# Patient Record
Sex: FEMALE | Race: ASIAN | HISPANIC OR LATINO
[De-identification: names, ages, dates, MRNs, and addresses within clinical notes are randomized per-mention and may not be internally consistent; named-entity substitution may affect disease eponyms.]

---

## 2019-04-10 ENCOUNTER — APPOINTMENT (OUTPATIENT)
Dept: FAMILY MEDICINE | Facility: CLINIC | Age: 19
End: 2019-04-10

## 2019-12-12 ENCOUNTER — APPOINTMENT (OUTPATIENT)
Dept: FAMILY MEDICINE | Facility: CLINIC | Age: 19
End: 2019-12-12
Payer: MEDICAID

## 2019-12-12 VITALS
BODY MASS INDEX: 27.45 KG/M2 | RESPIRATION RATE: 16 BRPM | HEIGHT: 62.5 IN | HEART RATE: 89 BPM | OXYGEN SATURATION: 99 % | SYSTOLIC BLOOD PRESSURE: 106 MMHG | DIASTOLIC BLOOD PRESSURE: 74 MMHG | WEIGHT: 153 LBS

## 2019-12-12 DIAGNOSIS — Z80.49 FAMILY HISTORY OF MALIGNANT NEOPLASM OF OTHER GENITAL ORGANS: ICD-10-CM

## 2019-12-12 DIAGNOSIS — Z82.49 FAMILY HISTORY OF ISCHEMIC HEART DISEASE AND OTHER DISEASES OF THE CIRCULATORY SYSTEM: ICD-10-CM

## 2019-12-12 PROCEDURE — 99385 PREV VISIT NEW AGE 18-39: CPT | Mod: 25

## 2019-12-12 PROCEDURE — 36415 COLL VENOUS BLD VENIPUNCTURE: CPT

## 2019-12-12 RX ORDER — LEVOCETIRIZINE DIHYDROCHLORIDE 5 MG/1
5 TABLET ORAL
Refills: 0 | Status: ACTIVE | COMMUNITY
Start: 2019-12-12

## 2019-12-12 RX ORDER — BUDESONIDE AND FORMOTEROL FUMARATE DIHYDRATE 80; 4.5 UG/1; UG/1
80-4.5 AEROSOL RESPIRATORY (INHALATION)
Refills: 0 | Status: ACTIVE | COMMUNITY
Start: 2019-12-12

## 2019-12-12 RX ORDER — ALBUTEROL SULFATE 90 UG/1
108 (90 BASE) AEROSOL, METERED RESPIRATORY (INHALATION)
Refills: 0 | Status: ACTIVE | COMMUNITY
Start: 2019-12-12

## 2019-12-12 NOTE — PHYSICAL EXAM
[20/___] : right eye 20/[unfilled] [Normal Oropharynx] : the oropharynx was normal [Snellen] : acuity screening with Snellen chart [No Lymphadenopathy] : no lymphadenopathy [Normal TMs] : both tympanic membranes were normal [Supple] : supple [Thyroid Normal, No Nodules] : the thyroid was normal and there were no nodules present [No Edema] : there was no peripheral edema [No Extremity Clubbing/Cyanosis] : no extremity clubbing/cyanosis [Grossly Normal Strength/Tone] : grossly normal strength/tone [Normal] : affect was normal and insight and judgment were intact

## 2019-12-12 NOTE — PLAN
[FreeTextEntry1] : Will follow up labwork drawn in office today.\par Will get us records from Pediatrician. \par Will consider flu vaccine. \par Eye exam form filled out.  20/20 on Snellen. \par \par

## 2019-12-12 NOTE — HEALTH RISK ASSESSMENT
[No] : No [# of Members in Household ___] :  household currently consist of [unfilled] member(s) [HIV test declined] : HIV test declined [Student] : student [College] : College [Single] : single [Fully functional (bathing, dressing, toileting, transferring, walking, feeding)] : Fully functional (bathing, dressing, toileting, transferring, walking, feeding) [Fully functional (using the telephone, shopping, preparing meals, housekeeping, doing laundry, using] : Fully functional and needs no help or supervision to perform IADLs (using the telephone, shopping, preparing meals, housekeeping, doing laundry, using transportation, managing medications and managing finances) [Reports normal functional visual acuity (ie: able to read med bottle)] : Reports normal functional visual acuity [] : No [de-identified] : Pulm/Allergy-August [de-identified] : Gym-3x a week  [de-identified] : Varied [Sexually Active] : not sexually active [Reports changes in hearing] : Reports no changes in hearing [Reports changes in vision] : Reports no changes in vision [de-identified] : 3 siblings [Reports changes in dental health] : Reports no changes in dental health [de-identified] : Childhood Ed at [employer]

## 2019-12-12 NOTE — HISTORY OF PRESENT ILLNESS
[Parent] : parent [FreeTextEntry1] : Ms. CAMPBELL presents for annual physical.\par  [de-identified] : Ms. CAMPBELL presents for annual physical.\par Senior in college-graduating in the spring. \par Feeling well today.  History of asthma, has not needed rescue inhaler in a long time.  Takes Symbicort regularly and follows with Pulm.\par \par LMP last thursday 5 days in length.  Periods have been regular.\par Needs vision form for DMV. \par Will get us records from Pediatrician.

## 2019-12-13 LAB
ALBUMIN SERPL ELPH-MCNC: 4.4 G/DL
ALP BLD-CCNC: 78 U/L
ALT SERPL-CCNC: 18 U/L
ANION GAP SERPL CALC-SCNC: 13 MMOL/L
AST SERPL-CCNC: 19 U/L
BASOPHILS # BLD AUTO: 0.03 K/UL
BASOPHILS NFR BLD AUTO: 0.4 %
BILIRUB SERPL-MCNC: 0.2 MG/DL
BUN SERPL-MCNC: 12 MG/DL
CALCIUM SERPL-MCNC: 9.6 MG/DL
CHLORIDE SERPL-SCNC: 105 MMOL/L
CO2 SERPL-SCNC: 24 MMOL/L
CREAT SERPL-MCNC: 0.72 MG/DL
EOSINOPHIL # BLD AUTO: 0.45 K/UL
EOSINOPHIL NFR BLD AUTO: 6.3 %
GLUCOSE SERPL-MCNC: 89 MG/DL
HCT VFR BLD CALC: 38.6 %
HGB BLD-MCNC: 12.2 G/DL
IMM GRANULOCYTES NFR BLD AUTO: 0.1 %
LYMPHOCYTES # BLD AUTO: 2.21 K/UL
LYMPHOCYTES NFR BLD AUTO: 31 %
MAN DIFF?: NORMAL
MCHC RBC-ENTMCNC: 25.4 PG
MCHC RBC-ENTMCNC: 31.6 GM/DL
MCV RBC AUTO: 80.2 FL
MONOCYTES # BLD AUTO: 0.37 K/UL
MONOCYTES NFR BLD AUTO: 5.2 %
NEUTROPHILS # BLD AUTO: 4.05 K/UL
NEUTROPHILS NFR BLD AUTO: 57 %
PLATELET # BLD AUTO: 406 K/UL
POTASSIUM SERPL-SCNC: 4.3 MMOL/L
PROT SERPL-MCNC: 7 G/DL
RBC # BLD: 4.81 M/UL
RBC # FLD: 15.4 %
SODIUM SERPL-SCNC: 142 MMOL/L
TSH SERPL-ACNC: 2.41 UIU/ML
WBC # FLD AUTO: 7.12 K/UL

## 2020-09-28 ENCOUNTER — APPOINTMENT (OUTPATIENT)
Dept: FAMILY MEDICINE | Facility: CLINIC | Age: 20
End: 2020-09-28
Payer: MEDICAID

## 2020-09-28 VITALS
HEART RATE: 82 BPM | OXYGEN SATURATION: 98 % | RESPIRATION RATE: 16 BRPM | SYSTOLIC BLOOD PRESSURE: 112 MMHG | WEIGHT: 141 LBS | DIASTOLIC BLOOD PRESSURE: 76 MMHG

## 2020-09-28 VITALS — TEMPERATURE: 98.3 F

## 2020-09-28 DIAGNOSIS — R22.9 LOCALIZED SWELLING, MASS AND LUMP, UNSPECIFIED: ICD-10-CM

## 2020-09-28 DIAGNOSIS — R23.8 OTHER SKIN CHANGES: ICD-10-CM

## 2020-09-28 PROCEDURE — 99214 OFFICE O/P EST MOD 30 MIN: CPT | Mod: 25

## 2020-09-28 PROCEDURE — 36415 COLL VENOUS BLD VENIPUNCTURE: CPT

## 2020-09-28 NOTE — PHYSICAL EXAM
[Normal Oropharynx] : the oropharynx was normal [Normal TMs] : both tympanic membranes were normal [Supple] : supple [Thyroid Normal, No Nodules] : the thyroid was normal and there were no nodules present [Normal] : affect was normal and insight and judgment were intact [de-identified] : right posterior bump behind SCM; pinna non tender [de-identified] : right neck-palpable lump, non tender, no active erythema or draiange, skin scaling over area.  Inflammed hair follicle superior to lesion.

## 2020-09-28 NOTE — PLAN
[FreeTextEntry1] : Stable exam.  No fever. \par Will send for imaging of area; recommending General Surgery evaluation.  Recurrent issue, mom reports similar episode happened last year. \par \par Will follow up labwork drawn in office today.  Can apply warm compress to inflammed hair follicle. \par \par Advised to seek immediate medical re-evaluation if any worsening symptoms/changes. \par Patient and mom expressed understanding.

## 2020-09-28 NOTE — REVIEW OF SYSTEMS
[Negative] : Genitourinary [Earache] : no earache [Sore Throat] : no sore throat [Headache] : no headache [Dizziness] : no dizziness [FreeTextEntry4] : Some tenderness last week. Right neck bump.

## 2020-09-28 NOTE — HISTORY OF PRESENT ILLNESS
[Parent] : parent [FreeTextEntry8] : Here for evaluation of right neck cyst. \par \par Had cyst on neck for 1 year-got bigger and started hurting last week. \par Started on Amoxicillin last week for 5 days. Feeling better today-not tender. \par \par Saw Dr. Durand-Allergy in April. \par Medications and allergies reviewed.\par \par \par

## 2020-09-29 DIAGNOSIS — D64.9 ANEMIA, UNSPECIFIED: ICD-10-CM

## 2020-09-29 LAB
ALBUMIN SERPL ELPH-MCNC: 4.3 G/DL
ALP BLD-CCNC: 70 U/L
ALT SERPL-CCNC: 15 U/L
ANION GAP SERPL CALC-SCNC: 17 MMOL/L
AST SERPL-CCNC: 24 U/L
BASOPHILS # BLD AUTO: 0.03 K/UL
BASOPHILS NFR BLD AUTO: 0.4 %
BILIRUB SERPL-MCNC: 0.2 MG/DL
BUN SERPL-MCNC: 13 MG/DL
CALCIUM SERPL-MCNC: 9.3 MG/DL
CHLORIDE SERPL-SCNC: 106 MMOL/L
CO2 SERPL-SCNC: 18 MMOL/L
CREAT SERPL-MCNC: 0.74 MG/DL
EOSINOPHIL # BLD AUTO: 0.53 K/UL
EOSINOPHIL NFR BLD AUTO: 7.6 %
GLUCOSE SERPL-MCNC: 71 MG/DL
HCT VFR BLD CALC: 37.7 %
HGB BLD-MCNC: 11.4 G/DL
IMM GRANULOCYTES NFR BLD AUTO: 0.3 %
LYMPHOCYTES # BLD AUTO: 2.17 K/UL
LYMPHOCYTES NFR BLD AUTO: 31 %
MAN DIFF?: NORMAL
MCHC RBC-ENTMCNC: 24.2 PG
MCHC RBC-ENTMCNC: 30.2 GM/DL
MCV RBC AUTO: 80 FL
MONOCYTES # BLD AUTO: 0.47 K/UL
MONOCYTES NFR BLD AUTO: 6.7 %
NEUTROPHILS # BLD AUTO: 3.79 K/UL
NEUTROPHILS NFR BLD AUTO: 54 %
PLATELET # BLD AUTO: 400 K/UL
POTASSIUM SERPL-SCNC: 4.3 MMOL/L
PROT SERPL-MCNC: 6.8 G/DL
RBC # BLD: 4.71 M/UL
RBC # FLD: 17.3 %
SODIUM SERPL-SCNC: 140 MMOL/L
WBC # FLD AUTO: 7.01 K/UL

## 2020-10-01 LAB
FERRITIN SERPL-MCNC: 11 NG/ML
IRON SATN MFR SERPL: 6 %
IRON SERPL-MCNC: 28 UG/DL
TIBC SERPL-MCNC: 444 UG/DL
TRANSFERRIN SERPL-MCNC: 327 MG/DL
UIBC SERPL-MCNC: 416 UG/DL

## 2023-05-02 ENCOUNTER — APPOINTMENT (OUTPATIENT)
Dept: FAMILY MEDICINE | Facility: CLINIC | Age: 23
End: 2023-05-02
Payer: MEDICAID

## 2023-05-02 VITALS
OXYGEN SATURATION: 97 % | BODY MASS INDEX: 28.22 KG/M2 | TEMPERATURE: 97.8 F | SYSTOLIC BLOOD PRESSURE: 117 MMHG | WEIGHT: 153.38 LBS | HEIGHT: 62 IN | HEART RATE: 93 BPM | DIASTOLIC BLOOD PRESSURE: 76 MMHG

## 2023-05-02 DIAGNOSIS — Z00.00 ENCOUNTER FOR GENERAL ADULT MEDICAL EXAMINATION W/OUT ABNORMAL FINDINGS: ICD-10-CM

## 2023-05-02 DIAGNOSIS — J45.909 UNSPECIFIED ASTHMA, UNCOMPLICATED: ICD-10-CM

## 2023-05-02 PROCEDURE — 99395 PREV VISIT EST AGE 18-39: CPT | Mod: 25

## 2023-05-03 ENCOUNTER — TRANSCRIPTION ENCOUNTER (OUTPATIENT)
Age: 23
End: 2023-05-03

## 2023-05-03 LAB
ALBUMIN SERPL ELPH-MCNC: 4.6 G/DL
ALP BLD-CCNC: 76 U/L
ALT SERPL-CCNC: 13 U/L
ANION GAP SERPL CALC-SCNC: 13 MMOL/L
AST SERPL-CCNC: 18 U/L
BASOPHILS # BLD AUTO: 0.03 K/UL
BASOPHILS NFR BLD AUTO: 0.3 %
BILIRUB SERPL-MCNC: 0.2 MG/DL
BUN SERPL-MCNC: 13 MG/DL
CALCIUM SERPL-MCNC: 9.1 MG/DL
CHLORIDE SERPL-SCNC: 102 MMOL/L
CHOLEST SERPL-MCNC: 208 MG/DL
CO2 SERPL-SCNC: 23 MMOL/L
CREAT SERPL-MCNC: 0.72 MG/DL
EGFR: 119 ML/MIN/1.73M2
EOSINOPHIL # BLD AUTO: 0.61 K/UL
EOSINOPHIL NFR BLD AUTO: 5.1 %
ESTIMATED AVERAGE GLUCOSE: 111 MG/DL
GLUCOSE SERPL-MCNC: 98 MG/DL
HBA1C MFR BLD HPLC: 5.5 %
HCT VFR BLD CALC: 38.3 %
HDLC SERPL-MCNC: 51 MG/DL
HGB BLD-MCNC: 11.8 G/DL
IMM GRANULOCYTES NFR BLD AUTO: 0.2 %
LDLC SERPL CALC-MCNC: 139 MG/DL
LYMPHOCYTES # BLD AUTO: 2.6 K/UL
LYMPHOCYTES NFR BLD AUTO: 21.8 %
MAN DIFF?: NORMAL
MCHC RBC-ENTMCNC: 23.4 PG
MCHC RBC-ENTMCNC: 30.8 GM/DL
MCV RBC AUTO: 75.8 FL
MONOCYTES # BLD AUTO: 0.57 K/UL
MONOCYTES NFR BLD AUTO: 4.8 %
NEUTROPHILS # BLD AUTO: 8.08 K/UL
NEUTROPHILS NFR BLD AUTO: 67.8 %
NONHDLC SERPL-MCNC: 157 MG/DL
PLATELET # BLD AUTO: 441 K/UL
POTASSIUM SERPL-SCNC: 3.9 MMOL/L
PROT SERPL-MCNC: 7.4 G/DL
RBC # BLD: 5.05 M/UL
RBC # FLD: 17.3 %
SODIUM SERPL-SCNC: 138 MMOL/L
TRIGL SERPL-MCNC: 92 MG/DL
TSH SERPL-ACNC: 1.56 UIU/ML
WBC # FLD AUTO: 11.91 K/UL

## 2023-06-27 ENCOUNTER — APPOINTMENT (OUTPATIENT)
Dept: FAMILY MEDICINE | Facility: CLINIC | Age: 23
End: 2023-06-27

## 2024-04-18 ENCOUNTER — NON-APPOINTMENT (OUTPATIENT)
Age: 24
End: 2024-04-18

## 2024-04-18 DIAGNOSIS — J45.41 MODERATE PERSISTENT ASTHMA WITH (ACUTE) EXACERBATION: ICD-10-CM

## 2024-04-30 ENCOUNTER — APPOINTMENT (OUTPATIENT)
Dept: PEDIATRIC ALLERGY IMMUNOLOGY | Facility: CLINIC | Age: 24
End: 2024-04-30
Payer: MEDICAID

## 2024-04-30 DIAGNOSIS — J45.40 MODERATE PERSISTENT ASTHMA, UNCOMPLICATED: ICD-10-CM

## 2024-04-30 DIAGNOSIS — J30.2 OTHER SEASONAL ALLERGIC RHINITIS: ICD-10-CM

## 2024-04-30 PROCEDURE — 99213 OFFICE O/P EST LOW 20 MIN: CPT | Mod: 25

## 2024-04-30 PROCEDURE — 94010 BREATHING CAPACITY TEST: CPT

## 2024-04-30 RX ORDER — BUDESONIDE AND FORMOTEROL FUMARATE DIHYDRATE 80; 4.5 UG/1; UG/1
80-4.5 AEROSOL RESPIRATORY (INHALATION)
Qty: 3 | Refills: 1 | Status: ACTIVE | COMMUNITY
Start: 2024-04-30 | End: 1900-01-01

## 2024-04-30 RX ORDER — ALBUTEROL SULFATE 90 UG/1
108 (90 BASE) INHALANT RESPIRATORY (INHALATION)
Qty: 1 | Refills: 1 | Status: ACTIVE | COMMUNITY
Start: 2024-04-30 | End: 1900-01-01

## 2024-04-30 NOTE — PHYSICAL EXAM
[Alert] : alert [No Acute Distress] : no acute distress [Normal Voice/Communication] : normal voice communication [Normal Pupil & Iris Size/Symmetry] : normal pupil and iris size and symmetry [No Discharge] : no discharge [Sclera Not Icteric] : sclera not icteric [Normal TMs] : both tympanic membranes were normal [Normal Nasal Mucosa] : the nasal mucosa was normal [Normal Outer Ear/Nose] : the ears and nose were normal in appearance [No Nasal Discharge] : no nasal discharge [No Thrush] : no thrush [No LAD] : no lymphadenopathy [Supple] : the neck was supple [Normal Rate and Effort] : normal respiratory rhythm and effort [No Crackles] : no crackles [Bilateral Audible Breath Sounds] : bilateral audible breath sounds [Normal Rate] : heart rate was normal  [Normal S1, S2] : normal S1 and S2 [No murmur] : no murmur [Regular Rhythm] : with a regular rhythm [Skin Intact] : skin intact  [No Rash] : no rash [Normal Mood] : mood was normal [Normal Affect] : affect was normal [Alert, Awake, Oriented as Age-Appropriate] : alert, awake, oriented as age appropriate [Wheezing] : no wheezing was heard

## 2024-04-30 NOTE — ASSESSMENT
[FreeTextEntry1] : Persistent asthma not in good control due to running out of Symbicort Restart Symbicort 80 2 puffs BID Albuterol Q4 PRN  Allergic Rhinitis due to spring pollen allergies Xyzal 5mg QHS Nasacort 2 sprays each nostril QD

## 2024-04-30 NOTE — HISTORY OF PRESENT ILLNESS
[de-identified] : Megan presents for a follow up for persistent asthma and to get refills. She ran out of Symbicort 80 about 2 weeks ago. She was fine until yesterday. Reports some intermittent chest tightness. Megan has known allergies to tree/grass pollen. She was out walking this past weekend and feels that may have triggered her asthma as well. Slept fine last night. She ran out of Albuterol as well. Presently she appears comfortable and in no apparent distress. When she is taking Symbicort on a regular basis her asthma is well controlled.  For spring time allergies she takes Xyzal and Nasacort which helps.

## 2025-06-05 ENCOUNTER — INPATIENT (INPATIENT)
Facility: HOSPITAL | Age: 25
LOS: 3 days | Discharge: HOME | End: 2025-06-09
Attending: OBSTETRICS & GYNECOLOGY | Admitting: OBSTETRICS & GYNECOLOGY

## 2025-06-05 VITALS
DIASTOLIC BLOOD PRESSURE: 68 MMHG | TEMPERATURE: 98 F | RESPIRATION RATE: 16 BRPM | OXYGEN SATURATION: 99 % | SYSTOLIC BLOOD PRESSURE: 113 MMHG | HEART RATE: 96 BPM

## 2025-06-05 LAB
BASOPHILS # BLD AUTO: 0.02 K/UL — SIGNIFICANT CHANGE UP (ref 0–0.2)
BASOPHILS NFR BLD AUTO: 0.2 % — SIGNIFICANT CHANGE UP (ref 0–2)
BLD GP AB SCN SERPL QL: NEGATIVE — SIGNIFICANT CHANGE UP
EOSINOPHIL # BLD AUTO: 0.05 K/UL — SIGNIFICANT CHANGE UP (ref 0–0.5)
EOSINOPHIL NFR BLD AUTO: 0.5 % — SIGNIFICANT CHANGE UP (ref 0–6)
HCT VFR BLD CALC: 37.9 % — SIGNIFICANT CHANGE UP (ref 34.5–45)
HGB BLD-MCNC: 12.7 G/DL — SIGNIFICANT CHANGE UP (ref 11.5–15.5)
IANC: 7.02 K/UL — SIGNIFICANT CHANGE UP (ref 1.8–7.4)
IMM GRANULOCYTES NFR BLD AUTO: 0.5 % — SIGNIFICANT CHANGE UP (ref 0–0.9)
LYMPHOCYTES # BLD AUTO: 1.53 K/UL — SIGNIFICANT CHANGE UP (ref 1–3.3)
LYMPHOCYTES # BLD AUTO: 16.7 % — SIGNIFICANT CHANGE UP (ref 13–44)
MCHC RBC-ENTMCNC: 27.5 PG — SIGNIFICANT CHANGE UP (ref 27–34)
MCHC RBC-ENTMCNC: 33.5 G/DL — SIGNIFICANT CHANGE UP (ref 32–36)
MCV RBC AUTO: 82.2 FL — SIGNIFICANT CHANGE UP (ref 80–100)
MONOCYTES # BLD AUTO: 0.47 K/UL — SIGNIFICANT CHANGE UP (ref 0–0.9)
MONOCYTES NFR BLD AUTO: 5.1 % — SIGNIFICANT CHANGE UP (ref 2–14)
NEUTROPHILS # BLD AUTO: 7.02 K/UL — SIGNIFICANT CHANGE UP (ref 1.8–7.4)
NEUTROPHILS NFR BLD AUTO: 77 % — SIGNIFICANT CHANGE UP (ref 43–77)
NRBC # BLD AUTO: 0 K/UL — SIGNIFICANT CHANGE UP (ref 0–0)
NRBC # FLD: 0 K/UL — SIGNIFICANT CHANGE UP (ref 0–0)
NRBC BLD AUTO-RTO: 0 /100 WBCS — SIGNIFICANT CHANGE UP (ref 0–0)
PLATELET # BLD AUTO: 292 K/UL — SIGNIFICANT CHANGE UP (ref 150–400)
RBC # BLD: 4.61 M/UL — SIGNIFICANT CHANGE UP (ref 3.8–5.2)
RBC # FLD: 16.1 % — HIGH (ref 10.3–14.5)
RH IG SCN BLD-IMP: POSITIVE — SIGNIFICANT CHANGE UP
RH IG SCN BLD-IMP: POSITIVE — SIGNIFICANT CHANGE UP
WBC # BLD: 9.14 K/UL — SIGNIFICANT CHANGE UP (ref 3.8–10.5)
WBC # FLD AUTO: 9.14 K/UL — SIGNIFICANT CHANGE UP (ref 3.8–10.5)

## 2025-06-05 RX ORDER — SODIUM CHLORIDE 9 G/1000ML
1000 INJECTION, SOLUTION INTRAVENOUS ONCE
Refills: 0 | Status: DISCONTINUED | OUTPATIENT
Start: 2025-06-05 | End: 2025-06-07

## 2025-06-05 RX ORDER — OXYTOCIN-SODIUM CHLORIDE 0.9% IV SOLN 30 UNIT/500ML 30-0.9/5 UT/ML-%
167 SOLUTION INTRAVENOUS
Qty: 30 | Refills: 0 | Status: DISCONTINUED | OUTPATIENT
Start: 2025-06-05 | End: 2025-06-07

## 2025-06-05 RX ORDER — SODIUM CHLORIDE 9 G/1000ML
1000 INJECTION, SOLUTION INTRAVENOUS
Refills: 0 | Status: DISCONTINUED | OUTPATIENT
Start: 2025-06-05 | End: 2025-06-07

## 2025-06-05 RX ADMIN — Medication 1 APPLICATION(S): at 16:00

## 2025-06-05 NOTE — OB PROVIDER H&P - ASSESSMENT
This is a 25 year old  at 41.6 weeks gestational age admitted for early labor/ postdates IOL    Plan discussed with Dr Camarena  admit for early labor/ postdates  IOL with Buccal cytotec  approved for epidural prn  routine orders    Risks, benefits, alternatives, and possible complications have been discussed in detail with the patient in her native language. Pre-admission, admission, and post admission procedures and expectations were discussed in detail. All questions answered, all appropriate hospital consents were signed. Anticipate normal vaginal delivery.    Informed consent was obtained. The following was discussed:    - Induction/augmentation of labor: use of medication and/or cook balloon to begin or enhance labor    - Obstetrical management including internal fetal/contraction monitoring    - Normal vaginal delivery    - Possible  section

## 2025-06-05 NOTE — OB PROVIDER H&P - NSHPPHYSICALEXAM_GEN_ALL_CORE
Vital Signs Last 24 Hrs  T(C): 36.8 (05 Jun 2025 07:57), Max: 36.8 (05 Jun 2025 07:57)  T(F): 98.2 (05 Jun 2025 07:57), Max: 98.2 (05 Jun 2025 07:57)  HR: 96 (05 Jun 2025 08:03) (96 - 96)  BP: 113/68 (05 Jun 2025 08:03) (113/68 - 113/68)  BP(mean): --  RR: 16 (05 Jun 2025 07:57) (16 - 16)  SpO2: 99% (05 Jun 2025 07:57) (99% - 99%)    A&O x3  CTAB  abdomen: gravid, soft, nontender  SVE 0/0/-3  Chaperoned by Aidee CHOWDARY  TAS: vtx, anterior placenta, mvp 5.7 fhr 132 bpm, images saved in asob  NST  Baseline  (  135        ) BPM  Variability (  x)  Moderate   (  ) Minimal  (  ) Absent  (  )  Marked  Accelerations (  ) 15x15   ( x ) 10x10  (  ) no  Decelerations (x ) no  (  ) Variable  (  ) Early  (  ) Late      Description _________  Contractions (  ) no  (x  ) yes     Description irregular  __________  Interpretation (x  ) reactive   (  )  non-reactive

## 2025-06-05 NOTE — OB RN PATIENT PROFILE - EDUCATION PROVIDED ON ASSESSMENT OF INFANT "FEEDING CUES" AND THE IMPORTANCE OF FEEDING "ON CUE" / "BABY-LED" FEEDINGS
Update History & Physical 
 
The Patient's History and Physical was reviewed with the patient. There was no change. The surgical site was confirmed by the patient and me. Plan:  The risk, benefits, expected outcome, and alternative to the recommended procedure have been discussed with the patient. Patient understands and wants to proceed with the procedure.  
 
 
Electronically signed by Ericka Duarte MD on 11/16/2020 at 9:48 AM 
 Statement Selected

## 2025-06-05 NOTE — OB RN TRIAGE NOTE - FALL HARM RISK - UNIVERSAL INTERVENTIONS
Bed in lowest position, wheels locked, appropriate side rails in place/Call bell, personal items and telephone in reach/Instruct patient to call for assistance before getting out of bed or chair/Non-slip footwear when patient is out of bed/[place] to call system/Physically safe environment - no spills, clutter or unnecessary equipment/Purposeful Proactive Rounding/Room/bathroom lighting operational, light cord in reach

## 2025-06-05 NOTE — OB PROVIDER H&P - NSLOWPPHRISK_OBGYN_A_OB
No previous uterine incision/Denny Pregnancy/Less than or equal to 4 previous vaginal births/No known bleeding disorder/No history of postpartum hemorrhage

## 2025-06-05 NOTE — OB PROVIDER H&P - HISTORY OF PRESENT ILLNESS
This is a 25 year old  at 41.6 weeks gestational age presents with complaints of irregular contractions and dark brown spotting. Reports +GFM, denies LOF, VB. Does not require pain management at this time    PNC: [facility]    AP course  - GBS neg     HIE- no sonograms available for review

## 2025-06-05 NOTE — OB RN PATIENT PROFILE - NS PRO AD PATIENT TYPE
Alert-The patient is alert, awake and responds to voice. The patient is oriented to time, place, and person. The triage nurse is able to obtain subjective information.
Health Care Proxy (HCP)

## 2025-06-06 LAB — T PALLIDUM AB TITR SER: NEGATIVE — SIGNIFICANT CHANGE UP

## 2025-06-06 RX ORDER — ONDANSETRON HCL/PF 4 MG/2 ML
4 VIAL (ML) INJECTION ONCE
Refills: 0 | Status: COMPLETED | OUTPATIENT
Start: 2025-06-06 | End: 2025-06-06

## 2025-06-06 RX ORDER — OXYTOCIN-SODIUM CHLORIDE 0.9% IV SOLN 30 UNIT/500ML 30-0.9/5 UT/ML-%
1 SOLUTION INTRAVENOUS
Qty: 30 | Refills: 0 | Status: DISCONTINUED | OUTPATIENT
Start: 2025-06-06 | End: 2025-06-07

## 2025-06-06 RX ORDER — ACETAMINOPHEN 500 MG/5ML
975 LIQUID (ML) ORAL ONCE
Refills: 0 | Status: COMPLETED | OUTPATIENT
Start: 2025-06-06 | End: 2025-06-06

## 2025-06-06 RX ORDER — OXYTOCIN-SODIUM CHLORIDE 0.9% IV SOLN 30 UNIT/500ML 30-0.9/5 UT/ML-%
SOLUTION INTRAVENOUS
Qty: 30 | Refills: 0 | Status: DISCONTINUED | OUTPATIENT
Start: 2025-06-06 | End: 2025-06-06

## 2025-06-06 RX ADMIN — SODIUM CHLORIDE 125 MILLILITER(S): 9 INJECTION, SOLUTION INTRAVENOUS at 10:47

## 2025-06-06 RX ADMIN — Medication 4 MILLIGRAM(S): at 22:11

## 2025-06-06 RX ADMIN — OXYTOCIN-SODIUM CHLORIDE 0.9% IV SOLN 30 UNIT/500ML 2 MILLIUNIT(S)/MIN: 30-0.9/5 SOLUTION at 10:47

## 2025-06-06 RX ADMIN — Medication 125 MILLILITER(S): at 20:43

## 2025-06-06 RX ADMIN — Medication 975 MILLIGRAM(S): at 03:07

## 2025-06-06 RX ADMIN — Medication 975 MILLIGRAM(S): at 07:15

## 2025-06-06 NOTE — OB PERINATAL HUDDLE NOTE - NS_HUDDLEPLAN_OBGYN_ALL_OB_FT
Pitocin to be paused   positioning to be changed   reassess when tracing category 1 to restart pitocin
Pitocin paused   Amnioinfusion going   restart pitocin when tracing reassuring for 20 minutes

## 2025-06-06 NOTE — OB PERINATAL HUDDLE NOTE - NS_HUDDLEASSDIAG_OBGYN_ALL_OB_FT
Discussed intermittent category 2 for past two hours
Patient with intermittent category 2 tracing since AROM

## 2025-06-07 ENCOUNTER — TRANSCRIPTION ENCOUNTER (OUTPATIENT)
Age: 25
End: 2025-06-07

## 2025-06-07 LAB
ALBUMIN SERPL ELPH-MCNC: 2.8 G/DL — LOW (ref 3.3–5)
ALBUMIN SERPL ELPH-MCNC: 2.9 G/DL — LOW (ref 3.3–5)
ALP SERPL-CCNC: 136 U/L — HIGH (ref 40–120)
ALP SERPL-CCNC: 175 U/L — HIGH (ref 40–120)
ALT FLD-CCNC: 14 U/L — SIGNIFICANT CHANGE UP (ref 4–33)
ALT FLD-CCNC: 15 U/L — SIGNIFICANT CHANGE UP (ref 4–33)
ANION GAP SERPL CALC-SCNC: 12 MMOL/L — SIGNIFICANT CHANGE UP (ref 7–14)
ANION GAP SERPL CALC-SCNC: 14 MMOL/L — SIGNIFICANT CHANGE UP (ref 7–14)
APTT BLD: 24.7 SEC — LOW (ref 26.1–36.8)
AST SERPL-CCNC: 32 U/L — SIGNIFICANT CHANGE UP (ref 4–32)
AST SERPL-CCNC: 37 U/L — HIGH (ref 4–32)
BASOPHILS # BLD AUTO: 0.02 K/UL — SIGNIFICANT CHANGE UP (ref 0–0.2)
BASOPHILS # BLD AUTO: 0.02 K/UL — SIGNIFICANT CHANGE UP (ref 0–0.2)
BASOPHILS NFR BLD AUTO: 0.1 % — SIGNIFICANT CHANGE UP (ref 0–2)
BASOPHILS NFR BLD AUTO: 0.1 % — SIGNIFICANT CHANGE UP (ref 0–2)
BILIRUB SERPL-MCNC: 0.3 MG/DL — SIGNIFICANT CHANGE UP (ref 0.2–1.2)
BILIRUB SERPL-MCNC: 0.5 MG/DL — SIGNIFICANT CHANGE UP (ref 0.2–1.2)
BUN SERPL-MCNC: 5 MG/DL — LOW (ref 7–23)
BUN SERPL-MCNC: 6 MG/DL — LOW (ref 7–23)
CALCIUM SERPL-MCNC: 8.1 MG/DL — LOW (ref 8.4–10.5)
CALCIUM SERPL-MCNC: 8.2 MG/DL — LOW (ref 8.4–10.5)
CHLORIDE SERPL-SCNC: 105 MMOL/L — SIGNIFICANT CHANGE UP (ref 98–107)
CHLORIDE SERPL-SCNC: 106 MMOL/L — SIGNIFICANT CHANGE UP (ref 98–107)
CO2 SERPL-SCNC: 17 MMOL/L — LOW (ref 22–31)
CO2 SERPL-SCNC: 19 MMOL/L — LOW (ref 22–31)
CREAT SERPL-MCNC: 0.8 MG/DL — SIGNIFICANT CHANGE UP (ref 0.5–1.3)
CREAT SERPL-MCNC: 1.09 MG/DL — SIGNIFICANT CHANGE UP (ref 0.5–1.3)
EGFR: 104 ML/MIN/1.73M2 — SIGNIFICANT CHANGE UP
EGFR: 104 ML/MIN/1.73M2 — SIGNIFICANT CHANGE UP
EGFR: 71 ML/MIN/1.73M2 — SIGNIFICANT CHANGE UP
EGFR: 71 ML/MIN/1.73M2 — SIGNIFICANT CHANGE UP
EOSINOPHIL # BLD AUTO: 0.01 K/UL — SIGNIFICANT CHANGE UP (ref 0–0.5)
EOSINOPHIL # BLD AUTO: 0.01 K/UL — SIGNIFICANT CHANGE UP (ref 0–0.5)
EOSINOPHIL NFR BLD AUTO: 0.1 % — SIGNIFICANT CHANGE UP (ref 0–6)
EOSINOPHIL NFR BLD AUTO: 0.1 % — SIGNIFICANT CHANGE UP (ref 0–6)
FIBRINOGEN PPP-MCNC: 476 MG/DL — HIGH (ref 200–465)
GLUCOSE SERPL-MCNC: 116 MG/DL — HIGH (ref 70–99)
GLUCOSE SERPL-MCNC: 125 MG/DL — HIGH (ref 70–99)
HCT VFR BLD CALC: 28.8 % — LOW (ref 34.5–45)
HCT VFR BLD CALC: 33.5 % — LOW (ref 34.5–45)
HGB BLD-MCNC: 11 G/DL — LOW (ref 11.5–15.5)
HGB BLD-MCNC: 9.5 G/DL — LOW (ref 11.5–15.5)
IANC: 15.85 K/UL — HIGH (ref 1.8–7.4)
IANC: 16.82 K/UL — HIGH (ref 1.8–7.4)
IMM GRANULOCYTES NFR BLD AUTO: 0.5 % — SIGNIFICANT CHANGE UP (ref 0–0.9)
IMM GRANULOCYTES NFR BLD AUTO: 0.6 % — SIGNIFICANT CHANGE UP (ref 0–0.9)
INR BLD: 1.03 RATIO — SIGNIFICANT CHANGE UP (ref 0.85–1.16)
LYMPHOCYTES # BLD AUTO: 0.91 K/UL — LOW (ref 1–3.3)
LYMPHOCYTES # BLD AUTO: 1.15 K/UL — SIGNIFICANT CHANGE UP (ref 1–3.3)
LYMPHOCYTES # BLD AUTO: 5.1 % — LOW (ref 13–44)
LYMPHOCYTES # BLD AUTO: 6 % — LOW (ref 13–44)
MCHC RBC-ENTMCNC: 27.5 PG — SIGNIFICANT CHANGE UP (ref 27–34)
MCHC RBC-ENTMCNC: 27.7 PG — SIGNIFICANT CHANGE UP (ref 27–34)
MCHC RBC-ENTMCNC: 32.8 G/DL — SIGNIFICANT CHANGE UP (ref 32–36)
MCHC RBC-ENTMCNC: 33 G/DL — SIGNIFICANT CHANGE UP (ref 32–36)
MCV RBC AUTO: 83.2 FL — SIGNIFICANT CHANGE UP (ref 80–100)
MCV RBC AUTO: 84.4 FL — SIGNIFICANT CHANGE UP (ref 80–100)
MONOCYTES # BLD AUTO: 0.96 K/UL — HIGH (ref 0–0.9)
MONOCYTES # BLD AUTO: 1.1 K/UL — HIGH (ref 0–0.9)
MONOCYTES NFR BLD AUTO: 5.4 % — SIGNIFICANT CHANGE UP (ref 2–14)
MONOCYTES NFR BLD AUTO: 5.7 % — SIGNIFICANT CHANGE UP (ref 2–14)
NEUTROPHILS # BLD AUTO: 15.85 K/UL — HIGH (ref 1.8–7.4)
NEUTROPHILS # BLD AUTO: 16.82 K/UL — HIGH (ref 1.8–7.4)
NEUTROPHILS NFR BLD AUTO: 87.6 % — HIGH (ref 43–77)
NEUTROPHILS NFR BLD AUTO: 88.7 % — HIGH (ref 43–77)
NRBC # BLD AUTO: 0 K/UL — SIGNIFICANT CHANGE UP (ref 0–0)
NRBC # BLD AUTO: 0 K/UL — SIGNIFICANT CHANGE UP (ref 0–0)
NRBC # FLD: 0 K/UL — SIGNIFICANT CHANGE UP (ref 0–0)
NRBC # FLD: 0 K/UL — SIGNIFICANT CHANGE UP (ref 0–0)
NRBC BLD AUTO-RTO: 0 /100 WBCS — SIGNIFICANT CHANGE UP (ref 0–0)
NRBC BLD AUTO-RTO: 0 /100 WBCS — SIGNIFICANT CHANGE UP (ref 0–0)
PLATELET # BLD AUTO: 204 K/UL — SIGNIFICANT CHANGE UP (ref 150–400)
PLATELET # BLD AUTO: 244 K/UL — SIGNIFICANT CHANGE UP (ref 150–400)
POTASSIUM SERPL-MCNC: 3 MMOL/L — LOW (ref 3.5–5.3)
POTASSIUM SERPL-MCNC: 3.8 MMOL/L — SIGNIFICANT CHANGE UP (ref 3.5–5.3)
POTASSIUM SERPL-SCNC: 3 MMOL/L — LOW (ref 3.5–5.3)
POTASSIUM SERPL-SCNC: 3.8 MMOL/L — SIGNIFICANT CHANGE UP (ref 3.5–5.3)
PROT SERPL-MCNC: 4.9 G/DL — LOW (ref 6–8.3)
PROT SERPL-MCNC: 5.4 G/DL — LOW (ref 6–8.3)
PROTHROM AB SERPL-ACNC: 12 SEC — SIGNIFICANT CHANGE UP (ref 9.9–13.4)
RBC # BLD: 3.46 M/UL — LOW (ref 3.8–5.2)
RBC # BLD: 3.97 M/UL — SIGNIFICANT CHANGE UP (ref 3.8–5.2)
RBC # FLD: 16.2 % — HIGH (ref 10.3–14.5)
RBC # FLD: 16.4 % — HIGH (ref 10.3–14.5)
SODIUM SERPL-SCNC: 136 MMOL/L — SIGNIFICANT CHANGE UP (ref 135–145)
SODIUM SERPL-SCNC: 137 MMOL/L — SIGNIFICANT CHANGE UP (ref 135–145)
WBC # BLD: 17.86 K/UL — HIGH (ref 3.8–10.5)
WBC # BLD: 19.19 K/UL — HIGH (ref 3.8–10.5)
WBC # FLD AUTO: 17.86 K/UL — HIGH (ref 3.8–10.5)
WBC # FLD AUTO: 19.19 K/UL — HIGH (ref 3.8–10.5)

## 2025-06-07 RX ORDER — CYCLOBENZAPRINE HYDROCHLORIDE 15 MG/1
5 CAPSULE, EXTENDED RELEASE ORAL ONCE
Refills: 0 | Status: COMPLETED | OUTPATIENT
Start: 2025-06-07 | End: 2025-06-07

## 2025-06-07 RX ORDER — SIMETHICONE 80 MG
80 TABLET,CHEWABLE ORAL EVERY 4 HOURS
Refills: 0 | Status: DISCONTINUED | OUTPATIENT
Start: 2025-06-07 | End: 2025-06-09

## 2025-06-07 RX ORDER — ACETAMINOPHEN 500 MG/5ML
975 LIQUID (ML) ORAL
Refills: 0 | Status: DISCONTINUED | OUTPATIENT
Start: 2025-06-07 | End: 2025-06-09

## 2025-06-07 RX ORDER — MODIFIED LANOLIN 100 %
1 CREAM (GRAM) TOPICAL EVERY 6 HOURS
Refills: 0 | Status: DISCONTINUED | OUTPATIENT
Start: 2025-06-07 | End: 2025-06-09

## 2025-06-07 RX ORDER — ERTAPENEM SODIUM 1 G/1
1000 INJECTION, POWDER, LYOPHILIZED, FOR SOLUTION INTRAMUSCULAR; INTRAVENOUS ONCE
Refills: 0 | Status: COMPLETED | OUTPATIENT
Start: 2025-06-07 | End: 2025-06-07

## 2025-06-07 RX ORDER — SENNA 187 MG
2 TABLET ORAL AT BEDTIME
Refills: 0 | Status: DISCONTINUED | OUTPATIENT
Start: 2025-06-07 | End: 2025-06-09

## 2025-06-07 RX ORDER — DIPHENHYDRAMINE HCL 12.5MG/5ML
25 ELIXIR ORAL ONCE
Refills: 0 | Status: COMPLETED | OUTPATIENT
Start: 2025-06-07 | End: 2025-06-07

## 2025-06-07 RX ORDER — OXYCODONE HYDROCHLORIDE 30 MG/1
5 TABLET ORAL ONCE
Refills: 0 | Status: DISCONTINUED | OUTPATIENT
Start: 2025-06-07 | End: 2025-06-09

## 2025-06-07 RX ORDER — OXYTOCIN-SODIUM CHLORIDE 0.9% IV SOLN 30 UNIT/500ML 30-0.9/5 UT/ML-%
167 SOLUTION INTRAVENOUS
Qty: 30 | Refills: 0 | Status: DISCONTINUED | OUTPATIENT
Start: 2025-06-07 | End: 2025-06-08

## 2025-06-07 RX ORDER — WITCH HAZEL LEAF
1 FLUID EXTRACT MISCELLANEOUS EVERY 4 HOURS
Refills: 0 | Status: DISCONTINUED | OUTPATIENT
Start: 2025-06-07 | End: 2025-06-09

## 2025-06-07 RX ORDER — PRENATAL 136/IRON/FOLIC ACID 27 MG-1 MG
1 TABLET ORAL DAILY
Refills: 0 | Status: DISCONTINUED | OUTPATIENT
Start: 2025-06-07 | End: 2025-06-09

## 2025-06-07 RX ORDER — HYDROCORTISONE 10 MG/G
1 CREAM TOPICAL EVERY 6 HOURS
Refills: 0 | Status: DISCONTINUED | OUTPATIENT
Start: 2025-06-07 | End: 2025-06-09

## 2025-06-07 RX ORDER — SODIUM CHLORIDE 9 G/1000ML
500 INJECTION, SOLUTION INTRAVENOUS ONCE
Refills: 0 | Status: COMPLETED | OUTPATIENT
Start: 2025-06-07 | End: 2025-06-07

## 2025-06-07 RX ORDER — IBUPROFEN 200 MG
600 TABLET ORAL EVERY 6 HOURS
Refills: 0 | Status: DISCONTINUED | OUTPATIENT
Start: 2025-06-07 | End: 2025-06-09

## 2025-06-07 RX ORDER — OXYCODONE HYDROCHLORIDE 30 MG/1
5 TABLET ORAL
Refills: 0 | Status: DISCONTINUED | OUTPATIENT
Start: 2025-06-07 | End: 2025-06-09

## 2025-06-07 RX ORDER — POLYETHYLENE GLYCOL 3350 17 G/17G
17 POWDER, FOR SOLUTION ORAL DAILY
Refills: 0 | Status: DISCONTINUED | OUTPATIENT
Start: 2025-06-07 | End: 2025-06-09

## 2025-06-07 RX ORDER — DIPHENHYDRAMINE HCL 12.5MG/5ML
25 ELIXIR ORAL EVERY 6 HOURS
Refills: 0 | Status: DISCONTINUED | OUTPATIENT
Start: 2025-06-07 | End: 2025-06-09

## 2025-06-07 RX ORDER — PRAMOXINE HCL 1 %
1 GEL (GRAM) TOPICAL EVERY 4 HOURS
Refills: 0 | Status: DISCONTINUED | OUTPATIENT
Start: 2025-06-07 | End: 2025-06-09

## 2025-06-07 RX ORDER — MAGNESIUM HYDROXIDE 400 MG/5ML
30 SUSPENSION ORAL
Refills: 0 | Status: DISCONTINUED | OUTPATIENT
Start: 2025-06-07 | End: 2025-06-09

## 2025-06-07 RX ORDER — BENZOCAINE 220 MG/G
1 SPRAY, METERED PERIODONTAL EVERY 6 HOURS
Refills: 0 | Status: DISCONTINUED | OUTPATIENT
Start: 2025-06-07 | End: 2025-06-09

## 2025-06-07 RX ORDER — KETOROLAC TROMETHAMINE 30 MG/ML
30 INJECTION, SOLUTION INTRAMUSCULAR; INTRAVENOUS ONCE
Refills: 0 | Status: DISCONTINUED | OUTPATIENT
Start: 2025-06-07 | End: 2025-06-07

## 2025-06-07 RX ORDER — OXYTOCIN-SODIUM CHLORIDE 0.9% IV SOLN 30 UNIT/500ML 30-0.9/5 UT/ML-%
10 SOLUTION INTRAVENOUS ONCE
Refills: 0 | Status: COMPLETED | OUTPATIENT
Start: 2025-06-07 | End: 2025-06-07

## 2025-06-07 RX ORDER — IBUPROFEN 200 MG
600 TABLET ORAL EVERY 6 HOURS
Refills: 0 | Status: COMPLETED | OUTPATIENT
Start: 2025-06-07 | End: 2026-05-06

## 2025-06-07 RX ORDER — DIBUCAINE 10 MG/G
1 OINTMENT TOPICAL EVERY 6 HOURS
Refills: 0 | Status: DISCONTINUED | OUTPATIENT
Start: 2025-06-07 | End: 2025-06-09

## 2025-06-07 RX ORDER — METOCLOPRAMIDE HCL 10 MG
10 TABLET ORAL ONCE
Refills: 0 | Status: COMPLETED | OUTPATIENT
Start: 2025-06-07 | End: 2025-06-07

## 2025-06-07 RX ADMIN — Medication 600 MILLIGRAM(S): at 17:36

## 2025-06-07 RX ADMIN — Medication 600 MILLIGRAM(S): at 13:22

## 2025-06-07 RX ADMIN — Medication 0.2 MILLIGRAM(S): at 03:48

## 2025-06-07 RX ADMIN — SODIUM CHLORIDE 1000 MILLILITER(S): 9 INJECTION, SOLUTION INTRAVENOUS at 15:00

## 2025-06-07 RX ADMIN — Medication 1 APPLICATION(S): at 20:04

## 2025-06-07 RX ADMIN — HYDROCORTISONE 1 APPLICATION(S): 10 CREAM TOPICAL at 20:04

## 2025-06-07 RX ADMIN — Medication 1 APPLICATION(S): at 09:23

## 2025-06-07 RX ADMIN — OXYTOCIN-SODIUM CHLORIDE 0.9% IV SOLN 30 UNIT/500ML 10 UNIT(S): 30-0.9/5 SOLUTION at 03:42

## 2025-06-07 RX ADMIN — DIBUCAINE 1 APPLICATION(S): 10 OINTMENT TOPICAL at 20:04

## 2025-06-07 RX ADMIN — ERTAPENEM SODIUM 100 MILLIGRAM(S): 1 INJECTION, POWDER, LYOPHILIZED, FOR SOLUTION INTRAMUSCULAR; INTRAVENOUS at 05:13

## 2025-06-07 RX ADMIN — Medication 20 MILLIEQUIVALENT(S): at 22:08

## 2025-06-07 RX ADMIN — CYCLOBENZAPRINE HYDROCHLORIDE 5 MILLIGRAM(S): 15 CAPSULE, EXTENDED RELEASE ORAL at 20:05

## 2025-06-07 RX ADMIN — Medication 1 TABLET(S): at 14:01

## 2025-06-07 RX ADMIN — POLYETHYLENE GLYCOL 3350 17 GRAM(S): 17 POWDER, FOR SOLUTION ORAL at 17:36

## 2025-06-07 RX ADMIN — Medication 3 MILLILITER(S): at 05:59

## 2025-06-07 RX ADMIN — Medication 600 MILLIGRAM(S): at 12:43

## 2025-06-07 RX ADMIN — Medication 3 MILLILITER(S): at 15:38

## 2025-06-07 RX ADMIN — Medication 20 MILLIEQUIVALENT(S): at 15:05

## 2025-06-07 RX ADMIN — BENZOCAINE 1 SPRAY(S): 220 SPRAY, METERED PERIODONTAL at 20:04

## 2025-06-07 RX ADMIN — KETOROLAC TROMETHAMINE 30 MILLIGRAM(S): 30 INJECTION, SOLUTION INTRAMUSCULAR; INTRAVENOUS at 05:55

## 2025-06-07 RX ADMIN — OXYTOCIN-SODIUM CHLORIDE 0.9% IV SOLN 30 UNIT/500ML 167 MILLIUNIT(S)/MIN: 30-0.9/5 SOLUTION at 05:59

## 2025-06-07 RX ADMIN — Medication 600 MILLIGRAM(S): at 18:05

## 2025-06-07 RX ADMIN — Medication 20 MILLIEQUIVALENT(S): at 17:36

## 2025-06-07 RX ADMIN — Medication 3 MILLILITER(S): at 22:06

## 2025-06-07 RX ADMIN — Medication 25 MILLIGRAM(S): at 13:59

## 2025-06-07 RX ADMIN — BENZOCAINE 1 SPRAY(S): 220 SPRAY, METERED PERIODONTAL at 09:23

## 2025-06-07 RX ADMIN — Medication 975 MILLIGRAM(S): at 08:37

## 2025-06-07 RX ADMIN — Medication 975 MILLIGRAM(S): at 08:00

## 2025-06-07 RX ADMIN — Medication 10 MILLIGRAM(S): at 14:07

## 2025-06-07 RX ADMIN — OXYTOCIN-SODIUM CHLORIDE 0.9% IV SOLN 30 UNIT/500ML 1 MILLIUNIT(S)/MIN: 30-0.9/5 SOLUTION at 07:00

## 2025-06-07 RX ADMIN — SODIUM CHLORIDE 125 MILLILITER(S): 9 INJECTION, SOLUTION INTRAVENOUS at 07:15

## 2025-06-07 RX ADMIN — OXYCODONE HYDROCHLORIDE 5 MILLIGRAM(S): 30 TABLET ORAL at 09:56

## 2025-06-07 RX ADMIN — Medication 2 TABLET(S): at 22:08

## 2025-06-07 NOTE — OB RN DELIVERY SUMMARY - NS_GENERALBABYACOMMENTA_OBGYN_ALL_OB_FT
skin to skin delayed d/t peds requesting to assess baby at warmer after delivery. mother declined to do skin to skin following delivery d/t feeling "tired". mother educated on benefits of skin to skin and verbalized her understanding.

## 2025-06-07 NOTE — OB RN DELIVERY SUMMARY - NSSELHIDDEN_OBGYN_ALL_OB_FT
[NS_DeliveryAttending1_OBGYN_ALL_OB_FT:[id number]=],[NS_DeliveryRN_OBGYN_ALL_OB_FT:[id number]=]

## 2025-06-07 NOTE — PROVIDER CONTACT NOTE (OTHER) - ASSESSMENT
pt asymptomatic. denies SOB, palpitations, dizziness.
Pain is a 7/10.
Pain is 6/10.
pt asymptomatic. denies SOB, palpitations, dizziness.
Patient denies any lightheaded, blurry vision, spots in eyes, dizziness. Neck pain worsening upon sitting and standing. Received Reglan & benadryl which brought pain down to 2. Patient also with tachycardia but asymptomatic

## 2025-06-07 NOTE — DISCHARGE NOTE OB - PLAN OF CARE
as above continue taking bowel regimen   Colace 2-3 times a day as needed, sent Rx to home pharmacy  encouraged po hydration continue taking po iron and vit C   encourage iron rich foods Follow up @ [facility] office in 7days for PP BP Check  Monitor BP @ home 3 times daily (morning/noon/night)  keep a strict blood pressure log and bring to the office 5-7 days after hospital discharge for review  if you have BP > 140/90 call the office for further advise  if you have headaches, vision changes, upper abdominal pain call the office to notify and go to [facility] Triage for evaluation

## 2025-06-07 NOTE — DISCHARGE NOTE OB - CARE PROVIDER_API CALL
Mariely Franco  Obstetrics and Gynecology  [address] [place] [id number]  Phone: [phone]  Fax: [phone]  Established Patient  Follow Up Time: 1 month

## 2025-06-07 NOTE — OB NEONATOLOGY/PEDIATRICIAN DELIVERY SUMMARY - NSPEDSNEONOTESA_OBGYN_ALL_OB_FT
Baby is a 42.1 wk AGA female born to a 24 y/o  mother via . Peds called to delivery for cat ii. Maternal history uncomplicated. Pregnancy complicated by postterm delivery. Maternal blood type A+. PNL: HIV neg/RPR NR/HBsAg neg/rubella immune. GBS negative on . AROM at 1703 on , clear fluids with terminal mec. Highest maternal temp 37.4. EOS: 0.46. Baby born vigorous and crying spontaneously. Warmed, dried, stimulated. Apgars 8/9. Mom plans to breastfeed / bottlefeed and consents hepB.     BW: 3610  : 25  TOB: 0329    Physical Exam:  Gen: NAD, +grimace  HEENT: anterior fontanel open soft and flat, no cleft lip/palate, ears normal set, no ear pits or tags. no lesions in mouth/throat, nares clinically patent, large level of bogginess primarily over L scalp with superimposed swelling crossing sutures, no fluid wave, not gravity dependent  Resp: no increased work of breathing, good air entry b/l, clear to auscultation bilaterally  Cardio: Normal S1/S2, regular rate and rhythm, no murmurs, rubs or gallops  Abd: soft, non tender, non distended, + bowel sounds, umbilical cord with 3 vessels  Neuro: +grasp/suck/derrick, normal tone  Extremities: negative lang and ortolani, moving all extremities, full range of motion x 4, no crepitus  Skin: pink, warm, sacral dimple with well visualized base  Genitals: normal female anatomy], Erwin 1, anus patent
Feeling better.  No new complaints.  Results are discussed with the patient.  Does not have any urinary symptoms.  Follow-up with gastroenterologist for continued care.  Strict return precautions are discussed all questions were answered.

## 2025-06-07 NOTE — OB RN DELIVERY SUMMARY - NS_SEPSISRSKCALC_OBGYN_ALL_OB_FT
EOS calculated successfully. EOS Risk Factor: 0.5/1000 live births ([facility] national incidence); GA=42w1d; Temp=99.32; ROM=10.433; GBS='Negative'; Antibiotics='No antibiotics or any antibiotics < 2 hrs prior to birth'

## 2025-06-07 NOTE — DISCHARGE NOTE OB - CARE PLAN
1 Principal Discharge DX:	 (normal spontaneous vaginal delivery)  Assessment and plan of treatment:	as above   Principal Discharge DX:	 (normal spontaneous vaginal delivery)  Assessment and plan of treatment:	as above  Secondary Diagnosis:	Third degree perineal laceration  Assessment and plan of treatment:	continue taking bowel regimen   Colace 2-3 times a day as needed, sent Rx to home pharmacy  encouraged po hydration  Secondary Diagnosis:	Anemia due to acute blood loss  Assessment and plan of treatment:	continue taking po iron and vit C   encourage iron rich foods  Secondary Diagnosis:	Gestational hypertension  Assessment and plan of treatment:	Follow up @ [facility] office in 7days for PP BP Check  Monitor BP @ home 3 times daily (morning/noon/night)  keep a strict blood pressure log and bring to the office 5-7 days after hospital discharge for review  if you have BP > 140/90 call the office for further advise  if you have headaches, vision changes, upper abdominal pain call the office to notify and go to [facility] Triage for evaluation

## 2025-06-07 NOTE — OB PROVIDER DELIVERY SUMMARY - NSPROVIDERDELIVERYNOTE_OBGYN_ALL_OB_FT
Delivery of female infant. Head, shoulders and body delivered without difficulty. Infant handed to mother. delayed cord clamping done . cord gases obtained. apgars 8/9. weight 7lbs 15oz. placenta delivered intact. 3rd degree laceration and repaired. intermittent uterine atony noted. Cytotec VT and hemabate given in addition to bimanual massage with improvement of atony Delivery of female infant. Head, shoulders and body delivered without difficulty. Infant handed to mother. delayed cord clamping done . cord gases obtained. apgars 8/9. weight 7lbs 15oz. placenta delivered intact. 3rd degree laceration and repaired. intermittent uterine atony noted. Cytotec NH and methergine given in addition to bimanual massage with improvement of atony

## 2025-06-07 NOTE — OB RN DELIVERY SUMMARY - NS_LABORCHARACTER_OBGYN_ALL_OB
Induction of labor-AROM/Augmentation of labor/Other - excessive bleeding/External electronic FM/Meconium staining

## 2025-06-07 NOTE — DISCHARGE NOTE OB - ADDITIONAL INSTRUCTIONS
Follow up @ [facility] office in 7days for PP BP Check  Monitor BP @ home 3 times daily (morning/noon/night)  keep a strict blood pressure log and bring to the office 5-7 days after hospital discharge for review  if you have BP > 140/90 call the office for further advise  if you have headaches, vision changes, upper abdominal pain call the office to notify and go to [facility] Triage for evaluation

## 2025-06-07 NOTE — CHART NOTE - NSCHARTNOTEFT_GEN_A_CORE
Contacted by RN due to patient having headache and neck pain. Patient initially complained of HA that was 7/10. Patient received tylenol, reglan and benadryl. When reevaluated, patient reported that it improved to a 2/10. A few hours later, patient reports that now she has neck pain along with the headache. Patient reports that the pain is worse when walking around. She also described the pain as feeling like a sore muscle. Patient denies changes in vision, chest pain, SOB. On physical exam, pain not reproducible on palpation. Patient did not report change in headache when bed was flat. Patient reports that she has not slept and feels tired. Patient encouraged to sleep and use heating pads on affected area. BP 110s/60s-70s.   - start Flexiril   - continue to monitor      D/w Dr. Marcus Weems PGY1
went to bedside to eval pt  when seen, pt c/o painful ctx, writhing, requesting epidural  last exam at 830am, discussed performing another exam to ensure not fully dilated  pt consented  poorly tolerant of exam however still latent labor  anesthesia called for epi  will recheck once pt comfortable, will place cb at that time if able
PA Note    seen & examined for cervical balloon falling out  comfortable with epidural    VS  T(C): 36.5 (06-06-25 @ 07:00)  HR: 98 (06-06-25 @ 09:12)  BP: 115/73 (06-06-25 @ 08:57)  RR: 18 (06-06-25 @ 07:00)  SpO2: 98% (06-06-25 @ 09:12)    140/min-mod deniz/intermittent late decels  [place] q 5-6min  4-5/70/-3    cont efm/toco  s/p buccal cytotec  will start pitocin when tracing allows - patient repositioned to R lateral   resuscitative measures in place  dw Dr Joan whitman
PA Note    seen & examined for cont of management  feeling some painful contractions    VS  T(C): 37.0 (06-06-25 @ 14:59)  HR: 112 (06-06-25 @ 16:52)  BP: 112/69 (06-06-25 @ 15:58)  RR: 16 (06-06-25 @ 14:59)  SpO2: 97% (06-06-25 @ 16:47)    135/min-mod deniz/intermittent late & variable decels  [place] q 2-4min  5/80/-2 AROM clear    cont efm/toco  IUPC placed for closer monitoring of ctx pattern  pitocin continues at 4mu  patient repositioned  dw Dr Joan whitman
Patient seen at bedside   tracing category 1   toco: irregular   ve: unchanged   pitocin at 2   continue to titrate pitocin   reassess as needed   comfortable with epidural in place
Pt reports increased pain   tracing category 1      pitocin at 3   VE: 7/80/-2   reassess as needed   top off if desired   continue to titrate pitocin
Pt seen and examined at bedside to discuss diagnosis of gHtn. Informed patient that given meeting criteria, she is at increased risk of developing hypertensive disorders and/or PEC in future pregnancies. Patient reported a headache which improved from 7/10 to 2/10 with Reglan/Benadryl/Tylenol. Patient denies s&s of sPEC.   - HELLP wnl   - monitor BP  - no need for antihypertensives at this time     Nini Weems PGY1

## 2025-06-07 NOTE — PROVIDER CONTACT NOTE (OTHER) - BACKGROUND
42wk1d IOL for post dates; GBS - (). afebrile; pr began pushing @0100
[facility]
Pt is g[facility]N, qBL 873ml.
 42wk IOL for post dates. VE @ 2200 /-2. Pitocin continues at 3mu/hr since @. AI continues @125cc/hr.

## 2025-06-07 NOTE — DISCHARGE NOTE OB - FINANCIAL ASSISTANCE
[facility] provides services at a reduced cost to those who are determined to be eligible through [facility]’s financial assistance program. Information regarding [facility]’s financial assistance program can be found by going to [email or web address] or by calling [phone].

## 2025-06-07 NOTE — PROVIDER CONTACT NOTE (OTHER) - RECOMMENDATIONS
continue to push; possible vacuum if necessary
MD Nini Weems made aware and advised to PO hydrate for tachycardia. Check if headache is positional and rate pain when lying and sitting.
Turn Pitocin off, reposition pt
POC discussed with MD team, orders for Benadryl and Reglan to be administered.
POC discussed with vani GAYTAN to administer PO tylenol at this time.

## 2025-06-07 NOTE — PROVIDER CONTACT NOTE (OTHER) - ACTION/TREATMENT ORDERED:
MD Nini Weems made aware and advised to PO hydrate for tachycardia. Check if headache is positional and rate pain when lying and sitting.
PO Tylenol 975mg to be administered at this time.
Benadryl and Reglan to be administered.
as per MD Franco and MD Castañeda - pt to continue to push as FHR tracing allows.
Pitocin to be turned off, pt to be repositioned in Spiderman position. Reassess FHR tracing in approx 30min

## 2025-06-07 NOTE — DISCHARGE NOTE OB - PATIENT PORTAL LINK FT
You can access the FollowMyHealth Patient Portal offered by [facility] by registering at the following website: [email or web address]. By joining [facility]’s FollowMyHealth portal, you will also be able to view your health information using other applications (apps) compatible with our system.

## 2025-06-07 NOTE — OB PROVIDER LABOR PROGRESS NOTE - ASSESSMENT
-CB placed   -continue buccal cytotec as per guidelines  -continue EFM/chay Becerra PGY1
  25y  at 42w1 IOL for LT.     - Continue IUCP, AI  - Continue EFM   -sp BC/CB  - pit paused, restart when able   - Epidural in place and effective    D/w Dr. Joan Chen, PGY2

## 2025-06-07 NOTE — PROVIDER CONTACT NOTE (OTHER) - SITUATION
CAT II FHR tracing persisting for >2hr; minimal variability with intermittent late decelerations.
pushing for >1hr with CAT II tracing --> minimal w/ late/variable decelerations
Pt s/p  of viable female @0329, s/p IV toradol @0556. Fundus firm and at umbillicus, bleeding is scant.
Patient complaining of neck pain radiating to head scale of 8/10.
Pt s/p  of viable female. Pt funuds is firm and at umbilicus. Bleeding is scant. Patient is complaining of head and neck pain described as heavy and 6/10. Denies blurry vision or visual changes.

## 2025-06-07 NOTE — PROVIDER CONTACT NOTE (OTHER) - NAME OF MD/NP/PA/DO NOTIFIED:
MD Franco, MD Felicity Ortega
pushing for >1hr w/ CAT II FHR tracing
Aaron GAYTAN
Aaron GAYTAN
MD Nini Weems

## 2025-06-08 LAB
BASOPHILS # BLD AUTO: 0.01 K/UL — SIGNIFICANT CHANGE UP (ref 0–0.2)
BASOPHILS NFR BLD AUTO: 0.1 % — SIGNIFICANT CHANGE UP (ref 0–2)
EOSINOPHIL # BLD AUTO: 0.06 K/UL — SIGNIFICANT CHANGE UP (ref 0–0.5)
EOSINOPHIL NFR BLD AUTO: 0.4 % — SIGNIFICANT CHANGE UP (ref 0–6)
HCT VFR BLD CALC: 24.5 % — LOW (ref 34.5–45)
HGB BLD-MCNC: 8.2 G/DL — LOW (ref 11.5–15.5)
IANC: 13.08 K/UL — HIGH (ref 1.8–7.4)
IMM GRANULOCYTES NFR BLD AUTO: 0.8 % — SIGNIFICANT CHANGE UP (ref 0–0.9)
LYMPHOCYTES # BLD AUTO: 1.8 K/UL — SIGNIFICANT CHANGE UP (ref 1–3.3)
LYMPHOCYTES # BLD AUTO: 11.4 % — LOW (ref 13–44)
MCHC RBC-ENTMCNC: 27.8 PG — SIGNIFICANT CHANGE UP (ref 27–34)
MCHC RBC-ENTMCNC: 33.5 G/DL — SIGNIFICANT CHANGE UP (ref 32–36)
MCV RBC AUTO: 83.1 FL — SIGNIFICANT CHANGE UP (ref 80–100)
MONOCYTES # BLD AUTO: 0.77 K/UL — SIGNIFICANT CHANGE UP (ref 0–0.9)
MONOCYTES NFR BLD AUTO: 4.9 % — SIGNIFICANT CHANGE UP (ref 2–14)
NEUTROPHILS # BLD AUTO: 13.08 K/UL — HIGH (ref 1.8–7.4)
NEUTROPHILS NFR BLD AUTO: 82.4 % — HIGH (ref 43–77)
NRBC # BLD AUTO: 0 K/UL — SIGNIFICANT CHANGE UP (ref 0–0)
NRBC # FLD: 0 K/UL — SIGNIFICANT CHANGE UP (ref 0–0)
NRBC BLD AUTO-RTO: 0 /100 WBCS — SIGNIFICANT CHANGE UP (ref 0–0)
PLATELET # BLD AUTO: 211 K/UL — SIGNIFICANT CHANGE UP (ref 150–400)
RBC # BLD: 2.95 M/UL — LOW (ref 3.8–5.2)
RBC # FLD: 16.9 % — HIGH (ref 10.3–14.5)
WBC # BLD: 15.84 K/UL — HIGH (ref 3.8–10.5)
WBC # FLD AUTO: 15.84 K/UL — HIGH (ref 3.8–10.5)

## 2025-06-08 RX ORDER — IRON SUCROSE 20 MG/ML
100 INJECTION, SOLUTION INTRAVENOUS EVERY 24 HOURS
Refills: 0 | Status: DISCONTINUED | OUTPATIENT
Start: 2025-06-08 | End: 2025-06-09

## 2025-06-08 RX ORDER — CYCLOBENZAPRINE HYDROCHLORIDE 15 MG/1
5 CAPSULE, EXTENDED RELEASE ORAL ONCE
Refills: 0 | Status: COMPLETED | OUTPATIENT
Start: 2025-06-08 | End: 2025-06-08

## 2025-06-08 RX ADMIN — IRON SUCROSE 210 MILLIGRAM(S): 20 INJECTION, SOLUTION INTRAVENOUS at 10:42

## 2025-06-08 RX ADMIN — Medication 975 MILLIGRAM(S): at 03:15

## 2025-06-08 RX ADMIN — Medication 600 MILLIGRAM(S): at 06:45

## 2025-06-08 RX ADMIN — Medication 975 MILLIGRAM(S): at 09:20

## 2025-06-08 RX ADMIN — Medication 2 TABLET(S): at 21:26

## 2025-06-08 RX ADMIN — Medication 975 MILLIGRAM(S): at 10:00

## 2025-06-08 RX ADMIN — Medication 600 MILLIGRAM(S): at 05:48

## 2025-06-08 RX ADMIN — Medication 600 MILLIGRAM(S): at 18:30

## 2025-06-08 RX ADMIN — POLYETHYLENE GLYCOL 3350 17 GRAM(S): 17 POWDER, FOR SOLUTION ORAL at 12:08

## 2025-06-08 RX ADMIN — Medication 975 MILLIGRAM(S): at 03:53

## 2025-06-08 RX ADMIN — Medication 975 MILLIGRAM(S): at 21:26

## 2025-06-08 RX ADMIN — Medication 1 TABLET(S): at 12:09

## 2025-06-08 RX ADMIN — Medication 975 MILLIGRAM(S): at 15:07

## 2025-06-08 RX ADMIN — CYCLOBENZAPRINE HYDROCHLORIDE 5 MILLIGRAM(S): 15 CAPSULE, EXTENDED RELEASE ORAL at 12:09

## 2025-06-08 RX ADMIN — Medication 3 MILLILITER(S): at 22:00

## 2025-06-08 RX ADMIN — Medication 600 MILLIGRAM(S): at 17:55

## 2025-06-08 RX ADMIN — Medication 975 MILLIGRAM(S): at 22:31

## 2025-06-08 RX ADMIN — Medication 975 MILLIGRAM(S): at 15:45

## 2025-06-08 NOTE — PROGRESS NOTE ADULT - SUBJECTIVE AND OBJECTIVE BOX
Doing well, no concerns today. Denies pain, only soreness in vaginal area. Dermaplast and witch hazel applications are helping. Bleeding like a period. Breastfeeding going well. Mood is good. Denies HA, vision changes, dizziness, CP, SOB, N/V.    O:  Vital Signs Last 24 Hrs  T(C): 36.9 (08 Jun 2025 06:00), Max: 37 (08 Jun 2025 01:45)  T(F): 98.5 (08 Jun 2025 06:00), Max: 98.6 (08 Jun 2025 01:45)  HR: 113 (08 Jun 2025 06:00) (103 - 114)  BP: 104/61 (08 Jun 2025 06:00) (101/56 - 115/61)  BP(mean): --  RR: 19 (08 Jun 2025 06:00) (18 - 19)  SpO2: 100% (08 Jun 2025 06:00) (99% - 100%)    Parameters below as of 08 Jun 2025 06:00  Patient On (Oxygen Delivery Method): room air    Gen: no acute distress  CV/Resp: nonlabored  Abd: soft, nontender, fundus firm below umbilicus  : minimal bleeding  Psych: appropriate mood and affect  Ext: nontender

## 2025-06-08 NOTE — PROGRESS NOTE ADULT - SUBJECTIVE AND OBJECTIVE BOX
POD #1 s/p vaginal divery. Patient is doing well, OOBAA. Tolerating clears. Pain is  tolerable. No residual anesthetic issues or complications noted. - Abdifatah, CRNA

## 2025-06-09 VITALS
OXYGEN SATURATION: 99 % | TEMPERATURE: 99 F | HEART RATE: 106 BPM | RESPIRATION RATE: 18 BRPM | DIASTOLIC BLOOD PRESSURE: 76 MMHG | SYSTOLIC BLOOD PRESSURE: 129 MMHG

## 2025-06-09 RX ORDER — ACETAMINOPHEN 500 MG/5ML
3 LIQUID (ML) ORAL
Qty: 0 | Refills: 0 | DISCHARGE
Start: 2025-06-09

## 2025-06-09 RX ORDER — IRON SUCROSE 20 MG/ML
200 INJECTION, SOLUTION INTRAVENOUS ONCE
Refills: 0 | Status: COMPLETED | OUTPATIENT
Start: 2025-06-09 | End: 2025-06-09

## 2025-06-09 RX ORDER — IBUPROFEN 200 MG
1 TABLET ORAL
Qty: 0 | Refills: 0 | DISCHARGE
Start: 2025-06-09

## 2025-06-09 RX ORDER — DIBUCAINE 10 MG/G
1 OINTMENT TOPICAL
Qty: 0 | Refills: 0 | DISCHARGE
Start: 2025-06-09

## 2025-06-09 RX ORDER — WITCH HAZEL LEAF
1 FLUID EXTRACT MISCELLANEOUS
Qty: 0 | Refills: 0 | DISCHARGE
Start: 2025-06-09

## 2025-06-09 RX ADMIN — Medication 600 MILLIGRAM(S): at 00:52

## 2025-06-09 RX ADMIN — Medication 600 MILLIGRAM(S): at 06:18

## 2025-06-09 RX ADMIN — Medication 975 MILLIGRAM(S): at 09:21

## 2025-06-09 RX ADMIN — Medication 600 MILLIGRAM(S): at 12:17

## 2025-06-09 RX ADMIN — Medication 600 MILLIGRAM(S): at 11:19

## 2025-06-09 RX ADMIN — Medication 975 MILLIGRAM(S): at 14:17

## 2025-06-09 RX ADMIN — Medication 1 TABLET(S): at 11:19

## 2025-06-09 RX ADMIN — IRON SUCROSE 110 MILLIGRAM(S): 20 INJECTION, SOLUTION INTRAVENOUS at 11:19

## 2025-06-09 RX ADMIN — Medication 975 MILLIGRAM(S): at 03:33

## 2025-06-09 RX ADMIN — Medication 600 MILLIGRAM(S): at 01:30

## 2025-06-09 RX ADMIN — Medication 975 MILLIGRAM(S): at 03:06

## 2025-06-09 RX ADMIN — Medication 3 MILLILITER(S): at 13:17

## 2025-06-09 RX ADMIN — Medication 975 MILLIGRAM(S): at 08:59

## 2025-06-09 RX ADMIN — POLYETHYLENE GLYCOL 3350 17 GRAM(S): 17 POWDER, FOR SOLUTION ORAL at 11:20

## 2025-06-09 RX ADMIN — Medication 600 MILLIGRAM(S): at 06:45

## 2025-06-09 NOTE — PROGRESS NOTE ADULT - ASSESSMENT
NP:  day 0 Progress Note:     Patient seen at bedside resting comfortably offers no current complaints. Ambulating and voiding without difficulty.  Passing flatus and tolerating regular diet.  Bonding well with .  Breastfeeding exclusively . Denies HA, CP, SOB, N/V/D, dizziness, palpitations,  worsening vaginal bleeding, or any other concerns.      Vital Signs Last 24 Hrs  T(C): 36.9 (2025 08:17), Max: 37.4 (2025 23:18)  T(F): 98.4 (2025 08:17), Max: 99.32 (2025 23:18)  HR: 107 (2025 08:17) (73 - 142)  BP: 117/75 (2025 08:17) (106/60 - 141/62)  BP(mean): --  RR: 19 (2025 08:17) (15 - 20)  SpO2: 98% (2025 08:17) (72% - 100%)    Parameters below as of 2025 08:17  Patient On (Oxygen Delivery Method): room air        Physical Exam:     Gen: A&Ox 3, NAD  Chest: CTA B/L  Cardiac: S1,S2; RRR  Breast: Soft, nontender, nonengorged  Abdomen: +BS, Soft, nontender, ND; Fundus firm below umbilicus  Gyn: mod lochia, intact/repaired  Ext: Nontender, DTRS 2+, no worsening edema                          11.0   17.86 )-----------( 244      ( 2025 04:20 )             33.5       A/P: 25yr old F PPD#0 s/p  2025 c/b GHTN     #GHTN   - BP x 24 hours: BP:  (106/60 - 141/62)  - Recent /75  - HELLP Labs- WNL   - No P/C ratio available   - BP cuff sent to home pharmacy   - BP monitoring   - Denies S/S of PEC     #PP  -Continue pain management  -Encourage OOB and ambulation  -Check CBC  -Continue current care    -d/w dr Marcus Jurado NP    
P1 s/p  6/7 POD#1    P  QBL 873cc  - hgb 8.2  this AM. slight tachycardia noted  - encouraged patient to hydrate, can consider  bolus  - IV iron ordered  - patient asx  gHTN  - BPs wnl  - HELLP labs AST 37/14 on arrival, CMP pending  - asx  encouraged ambulation and hydration    Dispo: monitor tachycardia and BPs today, plan for dc tomorrow with close outpatient followup
  Assessment and Plan  PPD #2 s/p  c/b 3rd degree laceration and PPH (s/p Cytotec [place] and Methergine IM). QBL 873ml. Anemia. GHTN. Headache that has resolved with reglan/benadryl. Neck pain improved>>taking flexeril. Doing well postpartum.   Noted to have urinary retention this morning, sono performed and noted to have >600ml, urinary zaragoza catheter placed. Zaragoza catheter now removed, due to void x2.     GHTN#  -BP x 24 hours: BP:  (99/57 - 118/64)  -Hellp labs noted for AST 37, ALT 14, no PC ratio sent pp  -Denies s/s of PEC  -not currently on any BP meds  -continue monitoring    Acute blood loss anemia#  -H/H 8.2/24.5  -QBL 873ml  -s/p Venofer x 2 doses  -continue taking po iron  -encourage iron rich foods  -asymptomatic    PPH/ 3rd deg lac#  -, s/p Cytotec [place] and Methergine IM)  -3rd deg lac, continue using vaginal meds and cloroplast  -continue bowel regimen, s/p Miralax and Senna  -outpatient, continue taking Colace, Rx sent to home pharmacy  -encourage po hydration    Doing well postpartum  Increase ambulation.  Encourage breastfeeding.  Continue PO Pain medications PRN    PP & PPD Instructions reviewed.  PP educational materials reviewed & provided   PEC s/s reviewed with patient    D/C home today    Follow up @ [facility] office in 7days for PP BP Check  Monitor BP @ home 3 times daily (morning/noon/night)  keep a strict blood pressure log and bring to the office 5-7 days after hospital discharge for review  if you have BP > 140/90 call the office for further advise  if you have headaches, vision changes, upper abdominal pain call the office to notify and go to [facility] Triage for evaluation      Discussed with MD Adalid Carlos

## 2025-06-09 NOTE — PROGRESS NOTE ADULT - SUBJECTIVE AND OBJECTIVE BOX
Post-partum Note,   She is a  25y woman who is now post-partum day: 2    Subjective:  The patient feels well.  She is ambulating.   She is tolerating regular diet.  She denies nausea and vomiting; denies fever.  She is voiding.  Her pain is controlled.  She reports normal postpartum bleeding.  She is breastfeeding.  She is formula feeding.  She is bonding with infant.    Physical exam:    Vital Signs Last 24 Hrs  T(C): 36.6 (2025 10:48), Max: 36.7 (2025 01:50)  T(F): 97.8 (2025 10:48), Max: 98.1 (2025 01:50)  HR: 94 (2025 10:48) (94 - 120)  BP: 116/59 (2025 10:48) (99/57 - 118/64)  BP(mean): --  RR: 18 (2025 10:48) (16 - 18)  SpO2: 100% (2025 10:48) (98% - 100%)    Parameters below as of 2025 06:00  Patient On (Oxygen Delivery Method): room air        Gen: NAD  Breast: Soft, nontender, not engorged.  Abdomen: Soft, nontender, no distension , firm uterine fundus at umbilicus.  Pelvic: Normal lochia noted  Ext: No calf tenderness    LABS:                        8.2    15.84 )-----------( 211      ( 2025 05:50 )             24.5       Rubella status:     Allergies    No Known Allergies    Intolerances      MEDICATIONS  (STANDING):  acetaminophen     Tablet .. 975 milliGRAM(s) Oral <User Schedule>  diphtheria/tetanus/pertussis (acellular) Vaccine (Adacel) 0.5 milliLiter(s) IntraMuscular once  ibuprofen  Tablet. 600 milliGRAM(s) Oral every 6 hours  polyethylene glycol 3350 17 Gram(s) Oral daily  prenatal multivitamin 1 Tablet(s) Oral daily  senna 2 Tablet(s) Oral at bedtime  sodium chloride 0.9% lock flush 3 milliLiter(s) IV Push every 8 hours    MEDICATIONS  (PRN):  benzocaine 20%/menthol 0.5% Spray 1 Spray(s) Topical every 6 hours PRN for Perineal discomfort  dibucaine 1% Ointment 1 Application(s) Topical every 6 hours PRN Perineal discomfort  diphenhydrAMINE 25 milliGRAM(s) Oral every 6 hours PRN Pruritus  hydrocortisone 1% Cream 1 Application(s) Topical every 6 hours PRN Moderate Pain (4-6)  lanolin Ointment 1 Application(s) Topical every 6 hours PRN nipple soreness  magnesium hydroxide Suspension 30 milliLiter(s) Oral two times a day PRN Constipation  oxyCODONE    IR 5 milliGRAM(s) Oral every 3 hours PRN Moderate to Severe Pain (4-10)  oxyCODONE    IR 5 milliGRAM(s) Oral once PRN Moderate to Severe Pain (4-10)  pramoxine 1%/zinc 5% Cream 1 Application(s) Topical every 4 hours PRN Moderate Pain (4-6)  simethicone 80 milliGRAM(s) Chew every 4 hours PRN Gas  witch hazel Pads 1 Application(s) Topical every 4 hours PRN Perineal discomfort

## 2025-06-12 PROBLEM — Z78.9 OTHER SPECIFIED HEALTH STATUS: Chronic | Status: ACTIVE | Noted: 2025-06-05

## 2025-06-17 ENCOUNTER — APPOINTMENT (OUTPATIENT)
Age: 25
End: 2025-06-17
Payer: MEDICAID

## 2025-06-17 PROCEDURE — S9445: CPT | Mod: 95

## 2025-07-16 NOTE — OB RN PATIENT PROFILE - [email or web address]_OB
Copied from CRM #[id number]. Topic: Appointments - Appointment Access  >> Jul 16, 2025 12:08 PM Summer wrote:  Pt called requesting a call back    Who Called? MOM     Reason For Call? PLEASE REACH OUT TO MOM ON SCHEDULING F/U VISIT AND URINE TEST     Best Call Back Number  [phone]    Thank you   Patient